# Patient Record
Sex: FEMALE | Race: WHITE | NOT HISPANIC OR LATINO | Employment: UNEMPLOYED | URBAN - METROPOLITAN AREA
[De-identification: names, ages, dates, MRNs, and addresses within clinical notes are randomized per-mention and may not be internally consistent; named-entity substitution may affect disease eponyms.]

---

## 2025-03-06 ENCOUNTER — OFFICE VISIT (OUTPATIENT)
Dept: OBSTETRICS AND GYNECOLOGY | Facility: CLINIC | Age: 30
End: 2025-03-06
Payer: COMMERCIAL

## 2025-03-06 VITALS
HEIGHT: 64 IN | BODY MASS INDEX: 30.8 KG/M2 | WEIGHT: 180.4 LBS | HEART RATE: 67 BPM | DIASTOLIC BLOOD PRESSURE: 82 MMHG | SYSTOLIC BLOOD PRESSURE: 112 MMHG

## 2025-03-06 DIAGNOSIS — Z01.419 WELL WOMAN EXAM WITH ROUTINE GYNECOLOGICAL EXAM: ICD-10-CM

## 2025-03-06 PROCEDURE — 87624 HPV HI-RISK TYP POOLED RSLT: CPT

## 2025-03-06 PROCEDURE — 3008F BODY MASS INDEX DOCD: CPT | Performed by: MIDWIFE

## 2025-03-06 PROCEDURE — 99395 PREV VISIT EST AGE 18-39: CPT | Performed by: MIDWIFE

## 2025-03-06 ASSESSMENT — ENCOUNTER SYMPTOMS
ENDOCRINE NEGATIVE: 1
EYES NEGATIVE: 1
CARDIOVASCULAR NEGATIVE: 1
PSYCHIATRIC NEGATIVE: 1
HEMATOLOGIC/LYMPHATIC NEGATIVE: 1
MUSCULOSKELETAL NEGATIVE: 1
RESPIRATORY NEGATIVE: 1
GASTROINTESTINAL NEGATIVE: 1
ALLERGIC/IMMUNOLOGIC NEGATIVE: 1
CONSTITUTIONAL NEGATIVE: 1
NEUROLOGICAL NEGATIVE: 1

## 2025-03-06 NOTE — PROGRESS NOTES
"aVni Marie is a 29 y.o. female who is here for a routine exam. Periods are regular every 28-30 days, lasting a few days. Dysmenorrhea:mild, occurring first 1-2 days of flow. No intermenstrual bleeding, spotting, or discharge.    Current contraception:  had vasectomy. Reports they have been discussing reversal and having 1 more. Still considering, has questions regarding preconception counseling.  History of abnormal Pap smear: yes - LGSIL with +HPV , no follow up  Family history of uterine or ovarian cancer: no  Regular self breast exam: yes  History of abnormal mammogram: no  Family history of breast cancer: no  Menstrual History: Regular, monthly cycles with 4-5 days menses  OB History    No obstetric history on file.       Patient's last menstrual period was 02/16/2025.     Review of Systems   Constitutional: Negative.    HENT: Negative.     Eyes: Negative.    Respiratory: Negative.     Cardiovascular: Negative.    Gastrointestinal: Negative.    Endocrine: Negative.    Genitourinary: Negative.    Musculoskeletal: Negative.    Skin: Negative.    Allergic/Immunologic: Negative.    Neurological: Negative.    Hematological: Negative.    Psychiatric/Behavioral: Negative.     All other systems reviewed and are negative.      Objective   /82 (BP Location: Left arm, Patient Position: Sitting, BP Cuff Size: Large adult)   Pulse 67   Ht 1.626 m (5' 4\")   Wt 81.8 kg (180 lb 6.4 oz)   LMP 02/16/2025   BMI 30.97 kg/m²     General:   alert, appears stated age, and cooperative   Heart: regular rate and rhythm, S1, S2 normal, no murmur, click, rub or gallop   Lungs: clear to auscultation bilaterally   Abdomen: soft, non-tender, without masses or organomegaly   Vulva: normal, Bartholin's, Urethra, [place]'s normal   Vagina: normal mucosa, normal discharge   Cervix: multiparous appearance, no bleeding following Pap, no cervical motion tenderness, and no lesions   Uterus: normal size   Adnexa: normal " adnexa     Assessment/Plan   A: Annual gynecologic exam      Preconception counseling    P: Reviewed BP, weight      Pap done      Mammogram not indicated      Discussed current SBE/Pap guidelines/recommendations      Recommended PNVs      Discussed pregnancy risks if she achieves pregnancy at/above 34yrs of age. Encouraged healthy diet/exercise      RTO in 1 year and sooner PRN
